# Patient Record
Sex: FEMALE | Race: WHITE | Employment: FULL TIME | URBAN - METROPOLITAN AREA
[De-identification: names, ages, dates, MRNs, and addresses within clinical notes are randomized per-mention and may not be internally consistent; named-entity substitution may affect disease eponyms.]

---

## 2023-09-22 ENCOUNTER — OFFICE VISIT (OUTPATIENT)
Age: 58
End: 2023-09-22
Payer: COMMERCIAL

## 2023-09-22 DIAGNOSIS — M25.512 LEFT SHOULDER PAIN, UNSPECIFIED CHRONICITY: Primary | ICD-10-CM

## 2023-09-22 PROCEDURE — 99214 OFFICE O/P EST MOD 30 MIN: CPT | Performed by: ORTHOPAEDIC SURGERY

## 2023-09-22 NOTE — PROGRESS NOTES
alert, appropriately oriented for age, and in no acute distress with a normal gait and normal affect who does not appear to be in any significant pain. Physical Exam:  Left Shoulder - Positive \"empty can\" test, Grossly neurovascularly intact. Range of motion-Full passive, Active with impingement. No Point tenderness, Strength-significant weakness noted with abduction, some mild crepitation, No skin lesion are identified, No instabilty is noted, No apprehension. No Swelling. Right Shoulder - grossly neurovascularly intact. Full Range of motion, No Weakness with abduction, No Point Tenderness, No skin lesion are identified, No instabilty is noted, No apprehension. No cuts or abrasions are identified. Visit Diagnoses         Codes    Left shoulder pain, unspecified chronicity    -  Primary M25.512               HPI:  The patient is here with a chief complaint of left shoulder pain, throbbing, burning pain. Neck pain as well. Pain is 5/10. Post cortisone injection with no relief. Continues to have difficulty. X-rays of the left shoulder are unremarkable. Assessment/Plan:  Plan at this point, my recommendation would be for an MRI of the left shoulder. I will see the patient post MRI of the shoulder and go from there. As part of continued conservative pain management options the patient was advised to utilize Tylenol or OTC NSAIDS as long as it is not medically contraindicated. Return to Office: Follow-up and Dispositions    Return for POST MRI. Scribed by Emily Ziegler LPN as dictated by [facility] ANDREA Hendricks MD.  Documentation, performed by, True and Accepted Ismael eHndricks MD

## 2023-09-30 ENCOUNTER — HOSPITAL ENCOUNTER (OUTPATIENT)
Facility: HOSPITAL | Age: 58
End: 2023-09-30
Attending: ORTHOPAEDIC SURGERY
Payer: COMMERCIAL

## 2023-09-30 DIAGNOSIS — M25.512 LEFT SHOULDER PAIN, UNSPECIFIED CHRONICITY: ICD-10-CM

## 2023-09-30 PROCEDURE — 73221 MRI JOINT UPR EXTREM W/O DYE: CPT

## 2023-10-13 ENCOUNTER — OFFICE VISIT (OUTPATIENT)
Age: 58
End: 2023-10-13
Payer: COMMERCIAL

## 2023-10-13 DIAGNOSIS — M25.512 LEFT SHOULDER PAIN, UNSPECIFIED CHRONICITY: Primary | ICD-10-CM

## 2023-10-13 PROCEDURE — 99212 OFFICE O/P EST SF 10 MIN: CPT | Performed by: ORTHOPAEDIC SURGERY

## 2023-10-13 RX ORDER — MONTELUKAST SODIUM 10 MG/1
10 TABLET ORAL NIGHTLY
COMMUNITY

## 2023-10-13 RX ORDER — PREGABALIN 75 MG/1
75 CAPSULE ORAL 2 TIMES DAILY
COMMUNITY

## 2023-10-13 NOTE — PROGRESS NOTES
Name: Mika Antunez    : 1965     [facility]  [address] [place] [address], [place] [address]  Dept: [phone]  Dept Fax: [phone]     Chief Complaint   Patient presents with    Shoulder Pain        There were no vitals taken for this visit. Allergies   Allergen Reactions    Sulfa Antibiotics Anaphylaxis     Other reaction(s): Not available, Unable to Obtain    Tetracyclines & Related Anaphylaxis and Other (See Comments)     Other reaction(s): Not available  Allergic to all cyclin drugs      Aspirin      Other reaction(s): gi distress, Not available    Ibuprofen      Other reaction(s): gi distress, Not available, Unable to Obtain    Onion      Other reaction(s): gi distress  RAW ONIONS (HANDS CRAMP/ SWEATS)    Soap      Other reaction(s): toxic skin reaction        Current Outpatient Medications   Medication Sig Dispense Refill    montelukast (SINGULAIR) 10 MG tablet Take 1 tablet by mouth nightly      pregabalin (LYRICA) 75 MG capsule Take 1 capsule by mouth 2 times daily. Max Daily Amount: 150 mg       No current facility-administered medications for this visit.       Patient Active Problem List   Diagnosis    DCIS (ductal carcinoma in situ)    Fibroids, subserous      Family History   Problem Relation Age of Onset    Hypertension Mother        Past Surgical History:   Procedure Laterality Date    BREAST SURGERY      lumpectomy    GYN          US BREAST BIOPSY W LOC DEVICE 1ST LESION LEFT Left 2019    US BREAST NEEDLE BIOPSY LEFT 2019 [facility] RAD US      Past Medical History:   Diagnosis Date    Cancer ([address])     breast cancer    Contact dermatitis and other eczema, due to unspecified cause     Diabetes ([address])     Hypertension     Migraines         I have reviewed and agree with [address] and [facility] and intake form in chart and the record furthermore I have reviewed prior medical record(s) regarding this

## 2023-10-13 NOTE — PATIENT INSTRUCTIONS
Shoulder Bursitis: Care Instructions  Overview     Bursitis is inflammation of the bursa. A bursa is a small sac of fluid that cushions a joint and helps it move easily. A bursa sits under the highest point of your shoulder. You can get bursitis by overusing your shoulder, which can happen with activities such as lifting, pitching a ball, or painting. Symptoms of bursitis include pain when you move your arm. Your arm may hurt when you try to lift it, and the pain can reach down the side of your arm. You may have trouble sleeping because of the pain. Bursitis usually gets better if you avoid the activity that caused it. If pain lasts or gets worse despite home treatment, your doctor may draw fluid from the bursa through a needle. This may relieve your pain and help your doctor know if you have an infection. If so, your doctor will prescribe antibiotics. If you have inflammation only, you may get a corticosteroid shot to reduce swelling and pain. Sometimes surgery is needed to drain or remove the bursa. Follow-up care is a key part of your treatment and safety. Be sure to make and go to all appointments, and call your doctor if you are having problems. It's also a good idea to know your test results and keep a list of the medicines you take. How can you care for yourself at home? Put ice or a cold pack on your shoulder for 10 to 20 minutes at a time. Put a thin cloth between the ice and your skin. After 3 days of using ice, use heat on your shoulder. You can use a hot water bottle, a heating pad set on low, or a warm, moist towel. Some doctors suggest alternating between hot and cold. Rest your shoulder. Stop any activities that cause pain. Switch to activities that do not stress your shoulder. Take your medicines exactly as prescribed. Call your doctor if you think you are having a problem with your medicine. If your doctor recommends it, take anti-inflammatory medicines to reduce pain.  These include